# Patient Record
Sex: FEMALE | Race: WHITE | NOT HISPANIC OR LATINO | URBAN - METROPOLITAN AREA
[De-identification: names, ages, dates, MRNs, and addresses within clinical notes are randomized per-mention and may not be internally consistent; named-entity substitution may affect disease eponyms.]

---

## 2017-03-15 ENCOUNTER — APPOINTMENT (RX ONLY)
Dept: URBAN - METROPOLITAN AREA CLINIC 156 | Facility: CLINIC | Age: 32
Setting detail: DERMATOLOGY
End: 2017-03-15

## 2017-03-15 DIAGNOSIS — Z41.9 ENCOUNTER FOR PROCEDURE FOR PURPOSES OTHER THAN REMEDYING HEALTH STATE, UNSPECIFIED: ICD-10-CM

## 2017-03-15 PROCEDURE — ? CHEMICAL PEEL

## 2017-03-15 NOTE — PROCEDURE: CHEMICAL PEEL
Prep: Peel prep pad
Comments: Extractions with q tips on cheeks and chin\\nRestorative mask applied for 5 minutes
Detail Level: Zone
Post-Care Instructions: I reviewed with the patient in detail post-care instructions. Patient should avoid sun exposure and wear sun protection.
Consent: Prior to the procedure, written consent was obtained and risks were reviewed, including but not limited to: redness, peeling, blistering, pigmentary change, scarring, infection, and pain.
Treatment Number: 0
Treatment Time (Optional): Peel left for 8 minutes
Chemical Peel: Vivite Peel
Post Peel Care: Pro heal and hydra cool , UV clear tinted

## 2018-05-14 ENCOUNTER — APPOINTMENT (RX ONLY)
Dept: URBAN - METROPOLITAN AREA CLINIC 156 | Facility: CLINIC | Age: 33
Setting detail: DERMATOLOGY
End: 2018-05-14

## 2018-05-14 DIAGNOSIS — Z41.9 ENCOUNTER FOR PROCEDURE FOR PURPOSES OTHER THAN REMEDYING HEALTH STATE, UNSPECIFIED: ICD-10-CM

## 2018-05-14 PROCEDURE — ? LASER HAIR REMOVAL

## 2018-05-14 NOTE — PROCEDURE: LASER HAIR REMOVAL
Cooling Override: 23
Cooling: DCD setting
Fluence (Will Not Render If 0): 20
Pulse Duration: 10 ms
Pulse Duration: 0.45 ms
Number Of Prepaid Treatments (Will Not Render If 0): 0
Fluence (Will Not Render If 0): 10
Consent: Written consent obtained, risks reviewed including but not limited to crusting, scabbing, blistering, scarring, darker or lighter pigmentary change, paradoxical hair regrowth, incomplete removal of hair and infection.
Post-Care Instructions: I reviewed with the patient in detail post-care instructions. Patient should avoid sun for a minimum of 4 weeks before and after treatment.
Cooling: DCD 30/20
Laser Type: Nd:Yag 1064nm
Cooling: DCD 40/30
Render Post-Care In The Note: No
Spot Size: 18 mm
Total Pulses: 45
Fluence (Will Not Render If 0): 12
Total Pulses: 286
Pre-Procedure: Prior to proceeding the treatment areas were cleaned and all present put on their eye protection.
Spot Size: 1.5 mm
Pulse Duration: 3 ms
Detail Level: Generalized
Post-Procedure Care: Immediate endpoint: perifollicular erythema and edema. Laser Gel or Extreme Protect were applied post laser. Post care reviewed with patient.
Device Serial Number (Optional): [id number]
Fluence (Will Not Render If 0): 20

## 2019-01-08 ENCOUNTER — APPOINTMENT (RX ONLY)
Dept: URBAN - METROPOLITAN AREA CLINIC 156 | Facility: CLINIC | Age: 34
Setting detail: DERMATOLOGY
End: 2019-01-08

## 2019-01-08 VITALS
RESPIRATION RATE: 18 BRPM | SYSTOLIC BLOOD PRESSURE: 125 MMHG | WEIGHT: 145 LBS | DIASTOLIC BLOOD PRESSURE: 95 MMHG | HEART RATE: 76 BPM

## 2019-01-08 DIAGNOSIS — D22 MELANOCYTIC NEVI: ICD-10-CM

## 2019-01-08 PROBLEM — D48.5 NEOPLASM OF UNCERTAIN BEHAVIOR OF SKIN: Status: ACTIVE | Noted: 2019-01-08

## 2019-01-08 PROCEDURE — ? SHAVE REMOVAL

## 2019-01-08 PROCEDURE — 11310 SHAVE SKIN LESION 0.5 CM/<: CPT

## 2019-01-08 ASSESSMENT — LOCATION SIMPLE DESCRIPTION DERM: LOCATION SIMPLE: NOSE

## 2019-01-08 ASSESSMENT — LOCATION DETAILED DESCRIPTION DERM: LOCATION DETAILED: NASAL SUPRATIP

## 2019-01-08 ASSESSMENT — LOCATION ZONE DERM: LOCATION ZONE: NOSE

## 2019-01-08 NOTE — PROCEDURE: SHAVE REMOVAL
Hemostasis: Drysol
Notification Instructions: Patient will be notified of biopsy results. However, patient instructed to call the office if not contacted within 2 weeks.
Size Of Lesion In Cm (Required): 0.4
Medical Necessity Information: It is in your best interest to select a reason for this procedure from the list below. All of these items fulfill various CMS LCD requirements except the new and changing color options.
Detail Level: Detailed
Billing Type: Third-Party Bill
X Size Of Lesion In Cm (Optional): 0.3
Lab Facility: [id number]
Lab: [id number]
Consent was obtained from the patient. The risks and benefits to therapy were discussed in detail. Specifically, the risks of infection, scarring, bleeding, prolonged wound healing, incomplete removal, allergy to anesthesia, nerve injury and recurrence were addressed. Prior to the procedure, the treatment site was clearly identified and confirmed by the patient. All components of Universal Protocol/PAUSE Rule completed.
Bill [id number] For Specimen Handling/Conveyance To Laboratory?: no
Wound Care: Petrolatum
Medical Necessity Clause: This procedure was medically necessary because the lesion that was treated was: enlarging
Anesthesia Type: 1% lidocaine with epinephrine
Path Notes (To The Dermatopathologist): Please check margins.
Was A Bandage Applied: Yes
Post-Care Instructions: I reviewed with the patient in detail post-care instructions. Patient is to keep the biopsy site dry overnight, and then apply bacitracin twice daily until healed. Patient may apply hydrogen peroxide soaks to remove any crusting.
Biopsy Method: Dermablade

## 2019-07-09 ENCOUNTER — APPOINTMENT (RX ONLY)
Dept: URBAN - METROPOLITAN AREA CLINIC 156 | Facility: CLINIC | Age: 34
Setting detail: DERMATOLOGY
End: 2019-07-09

## 2019-07-09 DIAGNOSIS — Z41.9 ENCOUNTER FOR PROCEDURE FOR PURPOSES OTHER THAN REMEDYING HEALTH STATE, UNSPECIFIED: ICD-10-CM

## 2019-07-09 PROCEDURE — ? LASER HAIR REMOVAL

## 2019-07-09 ASSESSMENT — LOCATION DETAILED DESCRIPTION DERM
LOCATION DETAILED: LEFT CHIN
LOCATION DETAILED: LEFT INFERIOR CENTRAL MALAR CHEEK
LOCATION DETAILED: RIGHT SUPERIOR MEDIAL BUCCAL CHEEK

## 2019-07-09 ASSESSMENT — LOCATION SIMPLE DESCRIPTION DERM
LOCATION SIMPLE: LEFT CHEEK
LOCATION SIMPLE: RIGHT CHEEK
LOCATION SIMPLE: CHIN

## 2019-07-09 ASSESSMENT — LOCATION ZONE DERM: LOCATION ZONE: FACE

## 2019-07-09 NOTE — PROCEDURE: LASER HAIR REMOVAL
Pre-Procedure: Prior to proceeding the treatment areas were cleaned and all present put on their eye protection.
Cooling: DCD 40/20
Total Pulses: 53
Pulse Duration: 0.45 ms
Treatment Number: 0
Consent: Written consent obtained, risks reviewed including but not limited to crusting, scabbing, blistering, scarring, darker or lighter pigmentary change, paradoxical hair regrowth, incomplete removal of hair and infection.
Fluence (Will Not Render If 0): 12
Fluence (Will Not Render If 0): 20
Spot Size: 18 mm
Spot Size: 1.5 mm
Laser Type: Nd:Yag 1064nm
Pulse Duration: 3 ms
Cooling: DCD setting
Detail Level: Generalized
Device Serial Number (Optional): [id number]
Render Post-Care In The Note: No
Fluence (Will Not Render If 0): 14
Total Pulses: 35
Post-Care Instructions: I reviewed with the patient in detail post-care instructions. Patient should avoid sun for a minimum of 4 weeks before and after treatment.
Post-Procedure Care: Immediate endpoint: perifollicular erythema and edema. Laser Gel or Extreme Protect were applied post laser. Post care reviewed with patient.
Cooling: DCD 40/30
Fluence (Will Not Render If 0): 20

## 2019-12-13 ENCOUNTER — APPOINTMENT (RX ONLY)
Dept: URBAN - METROPOLITAN AREA CLINIC 156 | Facility: CLINIC | Age: 34
Setting detail: DERMATOLOGY
End: 2019-12-13

## 2019-12-13 VITALS — SYSTOLIC BLOOD PRESSURE: 122 MMHG | HEART RATE: 64 BPM | WEIGHT: 140 LBS | DIASTOLIC BLOOD PRESSURE: 87 MMHG

## 2019-12-13 DIAGNOSIS — B35.1 TINEA UNGUIUM: ICD-10-CM

## 2019-12-13 PROBLEM — B35.3 TINEA PEDIS: Status: ACTIVE | Noted: 2019-12-13

## 2019-12-13 PROBLEM — L65.0 TELOGEN EFFLUVIUM: Status: ACTIVE | Noted: 2019-12-13

## 2019-12-13 PROCEDURE — ? COUNSELING

## 2019-12-13 PROCEDURE — ? TREATMENT REGIMEN

## 2019-12-13 PROCEDURE — 99213 OFFICE O/P EST LOW 20 MIN: CPT

## 2019-12-13 PROCEDURE — ? PRESCRIPTION

## 2019-12-13 RX ORDER — TAVABOROLE 43.5 MG/ML
1 SOLUTION TOPICAL QD
Qty: 1 | Refills: 2 | Status: ERX | COMMUNITY
Start: 2019-12-13

## 2019-12-13 RX ORDER — ECONAZOLE NITRATE 10 MG/G
1 CREAM TOPICAL BID
Qty: 1 | Refills: 3 | Status: ERX | COMMUNITY
Start: 2019-12-13

## 2019-12-13 RX ADMIN — TAVABOROLE 1: 43.5 SOLUTION TOPICAL at 00:00

## 2019-12-13 RX ADMIN — ECONAZOLE NITRATE 1: 10 CREAM TOPICAL at 00:00

## 2019-12-13 ASSESSMENT — LOCATION SIMPLE DESCRIPTION DERM: LOCATION SIMPLE: LEFT GREAT TOE

## 2019-12-13 ASSESSMENT — LOCATION DETAILED DESCRIPTION DERM: LOCATION DETAILED: LEFT DORSAL GREAT TOE

## 2019-12-13 ASSESSMENT — LOCATION ZONE DERM: LOCATION ZONE: TOE

## 2019-12-13 NOTE — PROCEDURE: TREATMENT REGIMEN
Continue Regimen: Spironolactone
Plan: Patient instructed to try Male minoxidil 5% and increase Biotin intake. Patient informed on PRP  for future treatment.
Detail Level: Simple

## 2021-08-16 ENCOUNTER — RX ONLY (OUTPATIENT)
Age: 36
Setting detail: RX ONLY
End: 2021-08-16

## 2021-08-16 ENCOUNTER — APPOINTMENT (RX ONLY)
Dept: URBAN - METROPOLITAN AREA CLINIC 154 | Facility: CLINIC | Age: 36
Setting detail: DERMATOLOGY
End: 2021-08-16

## 2021-08-16 DIAGNOSIS — Z41.9 ENCOUNTER FOR PROCEDURE FOR PURPOSES OTHER THAN REMEDYING HEALTH STATE, UNSPECIFIED: ICD-10-CM

## 2021-08-16 PROCEDURE — ? OTHER

## 2021-08-16 RX ORDER — PROGESTERONE 100 MG/1
CAPSULE ORAL
Qty: 90 | Refills: 0 | Status: ERX | COMMUNITY
Start: 2021-08-16

## 2021-08-16 RX ORDER — LEVOTHYROXINE, LIOTHYRONINE 38; 9 UG/1; UG/1
TABLET ORAL
Qty: 90 | Refills: 0 | Status: ERX | COMMUNITY
Start: 2021-08-16

## 2021-08-16 RX ORDER — TESTOSTERONE
POWDER (GRAM) MISCELLANEOUS
Qty: 90 | Refills: 0 | Status: ERX | COMMUNITY
Start: 2021-08-16

## 2021-08-16 RX ORDER — PROGESTERONE 200 MG/1
CAPSULE ORAL
Qty: 90 | Refills: 0 | Status: ERX | COMMUNITY
Start: 2021-08-16

## 2021-08-16 NOTE — PROCEDURE: OTHER
Note Text (......Xxx Chief Complaint.): This diagnosis correlates with the
Detail Level: Zone
Render Risk Assessment In Note?: no
Other (Free Text): VS: Wt 156.6lbs., /84, O2Sat 98%, HR 80\\nLMP Ablation 2016\\nLast Pap: 2020\\nLast MMG: NA\\n\\n36 y/o female presents for HRT f/u.  She was my patient from a previous location so we will get those records to review.  She has been off of her hormones for about 2 months now and she would like to restart because she really feels a difference.  She was recently started on lisinopril from her PCP because of elevated blood pressures.  She had an ablation but really felt last week like she was cramping and having breast tenderness.  We angie labs last visit with her.  OK to restart current regimen today.  No acute distress noted.\\n\\nA/P:\\n1.  HRT f/u:  refill current RXs, f/u in 3m.

## 2022-03-09 ENCOUNTER — RX ONLY (OUTPATIENT)
Age: 37
Setting detail: RX ONLY
End: 2022-03-09

## 2022-03-09 ENCOUNTER — APPOINTMENT (RX ONLY)
Dept: URBAN - METROPOLITAN AREA CLINIC 154 | Facility: CLINIC | Age: 37
Setting detail: DERMATOLOGY
End: 2022-03-09

## 2022-03-09 VITALS
WEIGHT: 165 LBS | OXYGEN SATURATION: 98 % | HEIGHT: 60 IN | SYSTOLIC BLOOD PRESSURE: 120 MMHG | DIASTOLIC BLOOD PRESSURE: 90 MMHG | HEART RATE: 82 BPM

## 2022-03-09 DIAGNOSIS — Z41.9 ENCOUNTER FOR PROCEDURE FOR PURPOSES OTHER THAN REMEDYING HEALTH STATE, UNSPECIFIED: ICD-10-CM

## 2022-03-09 PROCEDURE — ? OTHER

## 2022-03-09 RX ORDER — LEVOTHYROXINE, LIOTHYRONINE 38; 9 UG/1; UG/1
TABLET ORAL
Qty: 60 | Refills: 0 | Status: ERX | COMMUNITY
Start: 2022-03-09

## 2022-03-09 RX ORDER — TESTOSTERONE
POWDER (GRAM) MISCELLANEOUS
Qty: 60 | Refills: 0 | Status: ERX | COMMUNITY
Start: 2022-03-09

## 2022-03-09 RX ORDER — PROGESTERONE 200 MG/1
CAPSULE ORAL
Qty: 60 | Refills: 0 | Status: ERX | COMMUNITY
Start: 2022-03-09

## 2022-03-09 RX ORDER — PROGESTERONE 100 MG/1
CAPSULE ORAL
Qty: 60 | Refills: 0 | Status: ERX | COMMUNITY
Start: 2022-03-09

## 2022-03-10 ENCOUNTER — RX ONLY (OUTPATIENT)
Age: 37
Setting detail: RX ONLY
End: 2022-03-10

## 2022-03-10 RX ORDER — PHENTERMINE HYDROCHLORIDE 37.5 MG/1
37.5 MG TABLET ORAL
Qty: 60 | Refills: 0 | Status: ERX | COMMUNITY
Start: 2022-03-10

## 2022-04-27 ENCOUNTER — APPOINTMENT (RX ONLY)
Dept: URBAN - METROPOLITAN AREA CLINIC 154 | Facility: CLINIC | Age: 37
Setting detail: DERMATOLOGY
End: 2022-04-27

## 2022-04-27 ENCOUNTER — RX ONLY (OUTPATIENT)
Age: 37
Setting detail: RX ONLY
End: 2022-04-27

## 2022-04-27 VITALS
DIASTOLIC BLOOD PRESSURE: 70 MMHG | SYSTOLIC BLOOD PRESSURE: 100 MMHG | HEART RATE: 79 BPM | OXYGEN SATURATION: 97 % | WEIGHT: 165 LBS | HEIGHT: 60 IN

## 2022-04-27 DIAGNOSIS — Z41.9 ENCOUNTER FOR PROCEDURE FOR PURPOSES OTHER THAN REMEDYING HEALTH STATE, UNSPECIFIED: ICD-10-CM

## 2022-04-27 PROCEDURE — ? OTHER

## 2022-04-27 RX ORDER — PHENDIMETRAZINE TARTRATE 35 MG/1
TABLET ORAL
Qty: 90 | Refills: 0 | Status: ERX | COMMUNITY
Start: 2022-04-27

## 2022-05-02 ENCOUNTER — RX ONLY (OUTPATIENT)
Age: 37
Setting detail: RX ONLY
End: 2022-05-02

## 2022-05-02 RX ORDER — LEVOTHYROXINE, LIOTHYRONINE 38; 9 UG/1; UG/1
TABLET ORAL
Qty: 90 | Refills: 0 | Status: ERX

## 2022-05-02 RX ORDER — PROGESTERONE 200 MG/1
CAPSULE ORAL
Qty: 180 | Refills: 0 | Status: ERX

## 2022-05-02 RX ORDER — TESTOSTERONE
POWDER (GRAM) MISCELLANEOUS
Qty: 90 | Refills: 0 | Status: ERX

## 2022-07-27 ENCOUNTER — RX ONLY (OUTPATIENT)
Age: 37
Setting detail: RX ONLY
End: 2022-07-27

## 2022-07-27 ENCOUNTER — APPOINTMENT (RX ONLY)
Dept: URBAN - METROPOLITAN AREA CLINIC 154 | Facility: CLINIC | Age: 37
Setting detail: DERMATOLOGY
End: 2022-07-27

## 2022-07-27 VITALS
HEIGHT: 60 IN | DIASTOLIC BLOOD PRESSURE: 80 MMHG | OXYGEN SATURATION: 97 % | HEART RATE: 83 BPM | WEIGHT: 162 LBS | SYSTOLIC BLOOD PRESSURE: 110 MMHG

## 2022-07-27 DIAGNOSIS — Z41.9 ENCOUNTER FOR PROCEDURE FOR PURPOSES OTHER THAN REMEDYING HEALTH STATE, UNSPECIFIED: ICD-10-CM

## 2022-07-27 PROCEDURE — ? OTHER

## 2022-07-27 RX ORDER — LEVOTHYROXINE, LIOTHYRONINE 38; 9 UG/1; UG/1
TABLET ORAL
Qty: 90 | Refills: 0 | Status: ERX

## 2022-07-27 RX ORDER — TESTOSTERONE
POWDER (GRAM) MISCELLANEOUS
Qty: 90 | Refills: 0 | Status: ERX

## 2022-07-27 RX ORDER — PROGESTERONE 200 MG/1
CAPSULE ORAL
Qty: 180 | Refills: 0 | Status: ERX

## 2022-08-01 ENCOUNTER — RX ONLY (OUTPATIENT)
Age: 37
Setting detail: RX ONLY
End: 2022-08-01

## 2022-08-01 RX ORDER — TERBINAFINE HYDROCHLORIDE 250 MG/1
TABLET ORAL
Qty: 45 | Refills: 0 | Status: ERX | COMMUNITY
Start: 2022-08-01

## 2022-08-15 ENCOUNTER — RX ONLY (OUTPATIENT)
Age: 37
Setting detail: RX ONLY
End: 2022-08-15

## 2022-08-15 RX ORDER — FLUCONAZOLE 150 MG/1
TABLET ORAL
Qty: 12 | Refills: 0 | Status: ERX | COMMUNITY
Start: 2022-08-15

## 2022-08-15 RX ORDER — CICLOPIROX 80 MG/ML
SOLUTION TOPICAL
Qty: 1 | Refills: 0 | Status: ERX | COMMUNITY
Start: 2022-08-15

## 2022-10-31 ENCOUNTER — RX ONLY (OUTPATIENT)
Age: 37
Setting detail: RX ONLY
End: 2022-10-31

## 2022-10-31 RX ORDER — LEVOTHYROXINE, LIOTHYRONINE 38; 9 UG/1; UG/1
TABLET ORAL
Qty: 30 | Refills: 0 | Status: ERX

## 2022-10-31 RX ORDER — PROGESTERONE 200 MG/1
CAPSULE ORAL
Qty: 60 | Refills: 0 | Status: ERX

## 2022-12-01 ENCOUNTER — RX ONLY (OUTPATIENT)
Age: 37
Setting detail: RX ONLY
End: 2022-12-01

## 2022-12-01 RX ORDER — PROGESTERONE 200 MG/1
CAPSULE ORAL
Qty: 60 | Refills: 0 | Status: ERX

## 2022-12-05 ENCOUNTER — APPOINTMENT (RX ONLY)
Dept: URBAN - METROPOLITAN AREA CLINIC 154 | Facility: CLINIC | Age: 37
Setting detail: DERMATOLOGY
End: 2022-12-05

## 2022-12-05 VITALS
OXYGEN SATURATION: 99 % | HEIGHT: 60 IN | WEIGHT: 159 LBS | DIASTOLIC BLOOD PRESSURE: 70 MMHG | SYSTOLIC BLOOD PRESSURE: 120 MMHG | HEART RATE: 78 BPM

## 2022-12-05 DIAGNOSIS — Z41.9 ENCOUNTER FOR PROCEDURE FOR PURPOSES OTHER THAN REMEDYING HEALTH STATE, UNSPECIFIED: ICD-10-CM

## 2022-12-05 PROCEDURE — ? OTHER

## 2022-12-06 ENCOUNTER — RX ONLY (OUTPATIENT)
Age: 37
Setting detail: RX ONLY
End: 2022-12-06

## 2022-12-06 RX ORDER — TESTOSTERONE
POWDER (GRAM) MISCELLANEOUS
Qty: 90 | Refills: 0 | Status: ERX

## 2022-12-06 RX ORDER — PROGESTERONE 200 MG/1
CAPSULE ORAL
Qty: 180 | Refills: 2 | Status: ERX

## 2022-12-06 RX ORDER — LEVOTHYROXINE, LIOTHYRONINE 38; 9 UG/1; UG/1
TABLET ORAL
Qty: 90 | Refills: 1 | Status: ERX

## 2023-06-05 ENCOUNTER — RX ONLY (OUTPATIENT)
Age: 38
Setting detail: RX ONLY
End: 2023-06-05

## 2023-06-05 RX ORDER — PROGESTERONE 200 MG/1
CAPSULE ORAL
Qty: 180 | Refills: 0 | Status: ERX

## 2023-06-05 RX ORDER — LEVOTHYROXINE, LIOTHYRONINE 38; 9 UG/1; UG/1
TABLET ORAL
Qty: 30 | Refills: 0 | Status: ERX

## 2023-06-05 RX ORDER — TESTOSTERONE
POWDER (GRAM) MISCELLANEOUS
Qty: 30 | Refills: 0 | Status: ERX

## 2023-06-12 ENCOUNTER — APPOINTMENT (RX ONLY)
Dept: URBAN - METROPOLITAN AREA CLINIC 154 | Facility: CLINIC | Age: 38
Setting detail: DERMATOLOGY
End: 2023-06-12

## 2023-06-12 VITALS
SYSTOLIC BLOOD PRESSURE: 120 MMHG | HEART RATE: 88 BPM | DIASTOLIC BLOOD PRESSURE: 80 MMHG | OXYGEN SATURATION: 98 % | WEIGHT: 152 LBS | HEIGHT: 60 IN

## 2024-01-23 ENCOUNTER — RX ONLY (OUTPATIENT)
Age: 39
Setting detail: RX ONLY
End: 2024-01-23

## 2024-01-23 ENCOUNTER — APPOINTMENT (RX ONLY)
Dept: URBAN - METROPOLITAN AREA CLINIC 154 | Facility: CLINIC | Age: 39
Setting detail: DERMATOLOGY
End: 2024-01-23

## 2024-01-23 VITALS
SYSTOLIC BLOOD PRESSURE: 104 MMHG | DIASTOLIC BLOOD PRESSURE: 70 MMHG | HEIGHT: 60 IN | OXYGEN SATURATION: 99 % | HEART RATE: 82 BPM | WEIGHT: 127 LBS

## 2024-01-23 DIAGNOSIS — Z41.9 ENCOUNTER FOR PROCEDURE FOR PURPOSES OTHER THAN REMEDYING HEALTH STATE, UNSPECIFIED: ICD-10-CM

## 2024-01-23 PROCEDURE — ? OTHER

## 2024-01-23 RX ORDER — PROGESTERONE 200 MG/1
CAPSULE ORAL
Qty: 180 | Refills: 0 | Status: ERX

## 2024-01-23 RX ORDER — TESTOSTERONE
POWDER (GRAM) MISCELLANEOUS
Qty: 90 | Refills: 0 | Status: ERX

## 2024-01-26 ENCOUNTER — APPOINTMENT (RX ONLY)
Dept: URBAN - METROPOLITAN AREA CLINIC 154 | Facility: CLINIC | Age: 39
Setting detail: DERMATOLOGY
End: 2024-01-26

## 2024-01-26 DIAGNOSIS — Z41.9 ENCOUNTER FOR PROCEDURE FOR PURPOSES OTHER THAN REMEDYING HEALTH STATE, UNSPECIFIED: ICD-10-CM

## 2024-01-26 PROCEDURE — ? HYDRAFACIAL

## 2024-01-26 PROCEDURE — ? DERMAPLANE

## 2024-01-26 PROCEDURE — ? VBEAM PERFECTA LASER

## 2024-01-26 PROCEDURE — ? CAUTERY

## 2024-01-26 ASSESSMENT — LOCATION DETAILED DESCRIPTION DERM
LOCATION DETAILED: RIGHT INFERIOR MEDIAL FOREHEAD
LOCATION DETAILED: RIGHT MID TEMPLE
LOCATION DETAILED: RIGHT SUPERIOR TEMPLE
LOCATION DETAILED: LEFT MID TEMPLE
LOCATION DETAILED: LEFT INFERIOR TEMPLE
LOCATION DETAILED: RIGHT INFERIOR MEDIAL MALAR CHEEK

## 2024-01-26 ASSESSMENT — LOCATION SIMPLE DESCRIPTION DERM
LOCATION SIMPLE: RIGHT CHEEK
LOCATION SIMPLE: RIGHT TEMPLE
LOCATION SIMPLE: LEFT TEMPLE
LOCATION SIMPLE: RIGHT FOREHEAD

## 2024-01-26 ASSESSMENT — LOCATION ZONE DERM: LOCATION ZONE: FACE

## 2024-01-26 NOTE — PROCEDURE: CAUTERY
Detail Level: Zone
Anesthesia Volume In Cc: 0
Anesthesia Type: 1% lidocaine with epinephrine
Power (Include Units If Applicable): 1.1
Estimated Blood Loss (Optional): none

## 2024-01-26 NOTE — PROCEDURE: DERMAPLANE
Post-Procedure Instructions: Following the dermaplane procedure, Moisturizer and SPF was applied.
Treatment Areas: face
Treatment Area Prep: acetone
Blade: 10 blade scalpel
Post-Care Instructions: I reviewed with the patient in detail post-care instructions.
Detail Level: Zone
Pre-Procedure Text: The patient was placed in a recumbant position on the procedure table.

## 2024-01-26 NOTE — PROCEDURE: HYDRAFACIAL
Procedure: Exfoliation
Vacuum Pressure Low Setting (Will Not Render If Set To 0): 22
Solution Override
Tip Override
Location: face
Number Of Passes Step 3: 1
Tip Override: Lip Perk Hydropeel Tip
Vacuum Pressure High Setting (Will Not Render If Set To 0): 0
Number Of Passes Step 1: 2
Solution: Activ-4
Solution: GlySal 7.5%
Tip: Hydropeel Tip, Clear
Tip: Hydropeel Tip, Teal
Procedure: Fusion
Procedure: Peel
Vacuum Pressure Low Setting (Will Not Render If Set To 0): 13
Solution Override: Lip Perk Solution
Consent: Written consent obtained, risks reviewed including but not limited to crusting, scabbing, blistering, scarring, darker or lighter pigmentary change, bruising, and/or incomplete response.
Indication: anti-aging
Post-Care Instructions: I reviewed with the patient in detail post-care instructions. Patient should stay away from the sun and wear sun protection until treated areas are fully healed.
Tip: Hydropeel Tip, Blue
Solution: GlySal 15%
Procedure: Extraction
Vacuum Pressure Low Setting (Will Not Render If Set To 0): 12

## 2024-01-26 NOTE — PROCEDURE: VBEAM PERFECTA LASER
Delay Time (Ms): 0
Is There A Second Setting?: no
Pulse Duration: 1.5 ms
Pulse Duration: 40 ms
Consent: Written consent obtained.  Risks were reviewed including but not limited to crusting, scabbing, blistering, scarring, darker or lighter pigmentary change, bruising, and/or incomplete response.
Treatment Number: 1
Spot Size: 7 mm
Post-Care Instructions: I reviewed with the patient in detail post-care instructions.  Cool compresses or cold gel packs may be applied after treatment.  Exposure to the sun should be avoided and sun protection and sunscreen should be used.
Detail Level: Zone
Pre-Procedure: The treatment areas identified by the patient were cleansed and treatment was performed with the parameters mentioned above.
Spot Size: 10 mm
Pulse Duration: 10 ms
Location: lower lip
Spray Time (Ms): 30
Post-Procedure: Following the procedure the post care instructions were reviewed with the patient.
Delay Time (Ms): 20

## 2024-03-04 ENCOUNTER — APPOINTMENT (RX ONLY)
Dept: URBAN - METROPOLITAN AREA CLINIC 154 | Facility: CLINIC | Age: 39
Setting detail: DERMATOLOGY
End: 2024-03-04

## 2024-03-04 DIAGNOSIS — Z41.9 ENCOUNTER FOR PROCEDURE FOR PURPOSES OTHER THAN REMEDYING HEALTH STATE, UNSPECIFIED: ICD-10-CM

## 2024-03-04 PROCEDURE — ? SCITON MOXI

## 2024-03-04 PROCEDURE — ? SCITON BBL HERO

## 2024-03-04 ASSESSMENT — LOCATION SIMPLE DESCRIPTION DERM: LOCATION SIMPLE: RIGHT CHEEK

## 2024-03-04 ASSESSMENT — LOCATION ZONE DERM: LOCATION ZONE: FACE

## 2024-03-04 ASSESSMENT — LOCATION DETAILED DESCRIPTION DERM: LOCATION DETAILED: RIGHT INFERIOR CENTRAL MALAR CHEEK

## 2024-03-04 NOTE — PROCEDURE: SCITON MOXI
Treatment Number: 1
Total Coverage (%): 10
External Cooling Fan Speed: 5
Laser Type: Fractionated 1927nm, thulium laser
Detail Level: Zone
Post-Care Instructions: I reviewed with the patient in detail post-care instructions.  Recommended diligent sun protection and sun avoidance.
Pre=procedure Text: After consent was obtained, the treatment areas were cleaned and treated using the parameters noted above.
Level 3
Topical Anesthesia?: 23% lidocaine, 7% tetracaine
Energy (J/Cm2): 7.5
Consent: Written consent obtained.  Risks were reviewed including but not limited to crusting, scabbing, blistering, scarring, darker or lighter pigmentary change, incomplete improvement, prolonged erythema and facial swelling, infection, and bleeding.
Length Of Topical Anesthesia Application (Optional): 30 minutes
Anesthesia Type: 1% lidocaine with epinephrine
Number Of Passes: 6

## 2024-03-04 NOTE — PROCEDURE: SCITON BBL HERO
Procedure Note: After applying gel and applying protective eyeware, treatment was administered using the setting parameters listed above.
Pulse Width - Will Not Render If 0: 3
Add Setting 2?: no
Temp (C): 25
Pulse Width Units: milliseconds
Consent: Written consent obtained.  Risks reviewed including but not limited to crusting, scabbing, blistering, scarring, darker or lighter pigmentary change, and incomplete clearance.
Detail Level: Zone
Filter: 560nm Filter
Topical Anesthesia Type: 23% lidocaine, 7% tetracaine
Temp (C): 24
Fluence (J/Cm2) - Will Not Render If 0: 0
Anesthesia Type: 1% lidocaine with epinephrine
Filter: 515nm Filter
Spot Size: Finesse Adapter Size: 11 mm round
Temp (C): 15
Post-Care Instructions: I reviewed with the patient in detail post-care instructions. Patient should avoid sun exposure before and after treatment.  Patient should wear sunscreen.
Treatment Number: 1
Spot Size: Finesse Adapter Size: 11 mm square
Filter: 640nm Filter
Spot Size: Finesse Adapter Size: 15 x 45 mm (No Finesse Adapter)
Rate (Hz): 4
Fluence (J/Cm2) - Will Not Render If 0: 5
Spot Size: Finesse Adapter Size: 15 x 15 mm square
Total Pulses (Optional): 500

## 2024-04-17 ENCOUNTER — RX ONLY (OUTPATIENT)
Age: 39
Setting detail: RX ONLY
End: 2024-04-17

## 2024-04-17 RX ORDER — PROGESTERONE 200 MG/1
CAPSULE ORAL
Qty: 180 | Refills: 0 | Status: ERX

## 2024-04-17 RX ORDER — TESTOSTERONE
POWDER (GRAM) MISCELLANEOUS
Qty: 15 | Refills: 0 | Status: ERX

## 2024-04-29 ENCOUNTER — APPOINTMENT (RX ONLY)
Dept: URBAN - METROPOLITAN AREA CLINIC 154 | Facility: CLINIC | Age: 39
Setting detail: DERMATOLOGY
End: 2024-04-29

## 2024-04-29 VITALS — WEIGHT: 137 LBS | SYSTOLIC BLOOD PRESSURE: 100 MMHG | HEIGHT: 60 IN | DIASTOLIC BLOOD PRESSURE: 70 MMHG

## 2024-04-29 DIAGNOSIS — Z41.9 ENCOUNTER FOR PROCEDURE FOR PURPOSES OTHER THAN REMEDYING HEALTH STATE, UNSPECIFIED: ICD-10-CM

## 2024-04-29 PROCEDURE — ? OTHER

## 2024-05-01 ENCOUNTER — RX ONLY (OUTPATIENT)
Age: 39
Setting detail: RX ONLY
End: 2024-05-01

## 2024-05-01 RX ORDER — TESTOSTERONE
POWDER (GRAM) MISCELLANEOUS
Qty: 90 | Refills: 1 | Status: ERX

## 2024-07-03 ENCOUNTER — APPOINTMENT (RX ONLY)
Dept: URBAN - METROPOLITAN AREA CLINIC 154 | Facility: CLINIC | Age: 39
Setting detail: DERMATOLOGY
End: 2024-07-03

## 2024-07-03 DIAGNOSIS — Z41.9 ENCOUNTER FOR PROCEDURE FOR PURPOSES OTHER THAN REMEDYING HEALTH STATE, UNSPECIFIED: ICD-10-CM

## 2024-07-03 PROCEDURE — ? SCITON CLEARV LASER

## 2024-07-03 PROCEDURE — ? CAUTERY

## 2024-07-03 PROCEDURE — ? DERMAPLANE

## 2024-07-03 PROCEDURE — ? HYDRAFACIAL

## 2024-07-03 PROCEDURE — ? SCITON BARE HR

## 2024-07-03 ASSESSMENT — LOCATION SIMPLE DESCRIPTION DERM
LOCATION SIMPLE: RIGHT CHEEK
LOCATION SIMPLE: LEFT CHEEK
LOCATION SIMPLE: LEFT LIP

## 2024-07-03 ASSESSMENT — LOCATION DETAILED DESCRIPTION DERM
LOCATION DETAILED: LEFT CENTRAL MALAR CHEEK
LOCATION DETAILED: RIGHT INFERIOR CENTRAL MALAR CHEEK
LOCATION DETAILED: LEFT INFERIOR VERMILION LIP

## 2024-07-03 ASSESSMENT — LOCATION ZONE DERM
LOCATION ZONE: LIP
LOCATION ZONE: FACE

## 2024-07-03 NOTE — PROCEDURE: HYDRAFACIAL
Procedure: Extraction
Number Of Passes Step 6: 0
Vacuum Pressure Low Setting (Will Not Render If Set To 0): 13
Solution Override
Tip: Hydropeel Tip, Teal
Solution Override: Lip Perk Solution
Number Of Passes Step 4: 1
Procedure: Peel
Location: face
Solution: GlySal 7.5%
Procedure: Exfoliation
Vacuum Pressure Low Setting (Will Not Render If Set To 0): 12
Tip Override
Tip: Hydropeel Tip, Blue
Solution: GlySal 15%
Tip Override: Lip Perk Hydropeel Tip
Procedure: Fusion
Consent: Written consent obtained, risks reviewed including but not limited to crusting, scabbing, blistering, scarring, darker or lighter pigmentary change, bruising, and/or incomplete response.
Tip: Hydropeel Tip, Clear
Solution: Activ-4
Vacuum Pressure Low Setting (Will Not Render If Set To 0): 22
Number Of Passes Step 1: 2
Indication: anti-aging
Post-Care Instructions: I reviewed with the patient in detail post-care instructions. Patient should stay away from the sun and wear sun protection until treated areas are fully healed.

## 2024-07-03 NOTE — PROCEDURE: DERMAPLANE
Treatment Area Prep: acetone
Treatment Areas: face
Pre-Procedure Text: The patient was placed in a recumbant position on the procedure table.
Post-Procedure Instructions: Following the dermaplane procedure, Moisturizer and SPF was applied.
Post-Care Instructions: I reviewed with the patient in detail post-care instructions.
Detail Level: Zone
Blade: 10 blade scalpel

## 2024-07-03 NOTE — PROCEDURE: SCITON CLEARV LASER
Detail Level: Simple
Add Another Setting?: no
Laser Frequency (Include Units): 1.0
Location: lower lip
Vessel Size/Spot Size (Optional Include Units): 0.3
Skin Type: III
Fluence (Optional Include Units): 80
Consent: Written consent obtained, risks reviewed including but not limited to crusting, scabbing, blistering, scarring, darker or lighter pigmentary change, systemic reactions, ulceration, incidental hair removal, bruising, and/or incomplete removal.
Post-Care Instructions: I reviewed with the patient in detail post-care instructions. Patient should avoid sunlight and wear sun protection.
Pulse Width (Optional- Include Units): 30
Treatment Number (Will Not Render If 0): 1
External Cooling Fan Speed: 0
Indication Override: Ven[place]
Pulse Count (Optional): 3

## 2024-07-03 NOTE — PROCEDURE: CAUTERY
Anesthesia Type: 1% lidocaine with epinephrine
Detail Level: Zone
Anesthesia Volume In Cc: 0
Power (Include Units If Applicable): 1.1
Estimated Blood Loss (Optional): minimal

## 2024-07-03 NOTE — PROCEDURE: SCITON BARE HR
Anesthesia Type: 1% lidocaine with epinephrine
External Cooling Fan Speed: 0
Spot Size: 30mm x 9mm
Total Pulses (Optional): 243
Hair Density: high
Handpiece: Diode
Add Another Setting?: no
Consent: Written consent obtained.  Risks reviewed including but not limited to crusting, scabbing, blistering, scarring, darker or lighter pigmentary change, paradoxical hair regrowth, incomplete removal of hair and infection.
Handpiece: Blend
Procedure Note: Treatment was administered using the setting parameters listed above.
Hair Diameter: course
Treatment Mode: Speed
Treatment Number: 1
Fluence (J/Cm2): 13
Skin Type: III
Number Of Prepaid Treatments (Will Not Render If 0): 6
Post-Care Instructions: I reviewed with the patient in detail post-care instructions. Patient should avoid sun exposure before and after treatment.
Detail Level: Zone
Pulse Width (Include Units): 9
Hair Color: black
Rate (Hz): 3

## 2024-08-14 ENCOUNTER — APPOINTMENT (RX ONLY)
Dept: URBAN - METROPOLITAN AREA CLINIC 154 | Facility: CLINIC | Age: 39
Setting detail: DERMATOLOGY
End: 2024-08-14

## 2024-08-14 DIAGNOSIS — Z41.9 ENCOUNTER FOR PROCEDURE FOR PURPOSES OTHER THAN REMEDYING HEALTH STATE, UNSPECIFIED: ICD-10-CM

## 2024-08-14 PROCEDURE — ? DERMAPLANE

## 2024-08-14 PROCEDURE — ? SCITON CLEARV LASER

## 2024-08-14 PROCEDURE — ? SCITON BARE HR

## 2024-08-14 PROCEDURE — ? HYDRAFACIAL

## 2024-08-14 ASSESSMENT — LOCATION SIMPLE DESCRIPTION DERM
LOCATION SIMPLE: RIGHT CHEEK
LOCATION SIMPLE: LEFT LIP

## 2024-08-14 ASSESSMENT — LOCATION DETAILED DESCRIPTION DERM
LOCATION DETAILED: LEFT INFERIOR VERMILION LIP
LOCATION DETAILED: RIGHT INFERIOR CENTRAL MALAR CHEEK

## 2024-08-14 ASSESSMENT — LOCATION ZONE DERM
LOCATION ZONE: FACE
LOCATION ZONE: LIP

## 2024-08-14 NOTE — PROCEDURE: SCITON CLEARV LASER
Add Another Setting?: no
Fluence (Optional Include Units): 80
Pulse Width (Optional- Include Units): 30
Consent: Written consent obtained, risks reviewed including but not limited to crusting, scabbing, blistering, scarring, darker or lighter pigmentary change, systemic reactions, ulceration, incidental hair removal, bruising, and/or incomplete removal.
Detail Level: Simple
Post-Care Instructions: I reviewed with the patient in detail post-care instructions. Patient should avoid sunlight and wear sun protection.
Vessel Size/Spot Size (Optional Include Units): 0.4
Laser Frequency (Include Units): 1.0
Treatment Number (Will Not Render If 0): 2
External Cooling Fan Speed: 0
Indication Override: venous lake
Location: upper and lower lip
Pulse Count (Optional): 3 pulses each spot
Skin Type: III

## 2024-08-14 NOTE — PROCEDURE: HYDRAFACIAL
Tip: Hydropeel Tip, Teal
Vacuum Pressure Low Setting (Will Not Render If Set To 0): 13
Solution Override
Location: face
Procedure: Extraction
Vacuum Pressure Low Setting (Will Not Render If Set To 0): 22
Number Of Passes Step 4: 1
Solution Override: Lip Perk Solution
Number Of Passes Step 1: 2
Vacuum Pressure Low Setting (Will Not Render If Set To 0): 12
Vacuum Pressure High Setting (Will Not Render If Set To 0): 0
Solution: Activ-4
Consent: Written consent obtained, risks reviewed including but not limited to crusting, scabbing, blistering, scarring, darker or lighter pigmentary change, bruising, and/or incomplete response.
Tip Override
Solution: GlySal 7.5%
Post-Care Instructions: I reviewed with the patient in detail post-care instructions. Patient should stay away from the sun and wear sun protection until treated areas are fully healed.
Tip: Hydropeel Tip, Clear
Procedure: Peel
Tip Override: Lip Perk Hydropeel Tip
Procedure: Fusion
Indication: anti-aging
Tip: Hydropeel Tip, Blue
Solution: GlySal 15%
Procedure: Exfoliation

## 2024-08-14 NOTE — PROCEDURE: DERMAPLANE
Pre-Procedure Text: The patient was placed in a recumbant position on the procedure table.
Post-Procedure Instructions: Following the dermaplane procedure, Moisturizer and SPF was applied.
Detail Level: Zone
Treatment Area Prep: acetone
Treatment Areas: face
Post-Care Instructions: I reviewed with the patient in detail post-care instructions.
Blade: 10 blade scalpel

## 2024-08-14 NOTE — PROCEDURE: SCITON BARE HR
Add Another Setting?: no
Hair Density: high
Detail Level: Zone
Handpiece: Blend
Add Another Setting?: yes
Hair Diameter: course
External Cooling Fan Speed: 0
Fluence (J/Cm2): 15
Consent: Written consent obtained.  Risks reviewed including but not limited to crusting, scabbing, blistering, scarring, darker or lighter pigmentary change, paradoxical hair regrowth, incomplete removal of hair and infection.
Treatment Mode: Speed
Fluence (J/Cm2): 9
Procedure Note: Treatment was administered using the setting parameters listed above.
Pulse Width (Include Units): 11
Anesthesia Type: 1% lidocaine with epinephrine
Pulse Width (Include Units): 30
Post-Care Instructions: I reviewed with the patient in detail post-care instructions. Patient should avoid sun exposure before and after treatment.
Rate (Hz): 3
Spot Size: 30mm x 9mm
Treatment Number: 2
Hair Color: black
Total Pulses (Optional): 108
Number Of Prepaid Treatments (Will Not Render If 0): 6
Total Pulses (Optional): 199

## 2024-08-16 ENCOUNTER — RX ONLY (OUTPATIENT)
Age: 39
Setting detail: RX ONLY
End: 2024-08-16

## 2024-08-16 RX ORDER — PROGESTERONE 200 MG/1
CAPSULE ORAL
Qty: 180 | Refills: 0 | Status: ERX

## 2024-09-24 ENCOUNTER — APPOINTMENT (RX ONLY)
Dept: URBAN - METROPOLITAN AREA CLINIC 154 | Facility: CLINIC | Age: 39
Setting detail: DERMATOLOGY
End: 2024-09-24

## 2024-09-24 DIAGNOSIS — Z41.9 ENCOUNTER FOR PROCEDURE FOR PURPOSES OTHER THAN REMEDYING HEALTH STATE, UNSPECIFIED: ICD-10-CM

## 2024-09-24 PROCEDURE — ? SCITON BARE HR

## 2024-09-24 PROCEDURE — ? HYDRAFACIAL

## 2024-09-24 PROCEDURE — ? SCITON CLEARV LASER

## 2024-09-24 PROCEDURE — ? DERMAPLANE

## 2024-09-24 ASSESSMENT — LOCATION ZONE DERM
LOCATION ZONE: LIP
LOCATION ZONE: FACE

## 2024-09-24 ASSESSMENT — LOCATION SIMPLE DESCRIPTION DERM
LOCATION SIMPLE: LEFT LIP
LOCATION SIMPLE: RIGHT CHEEK

## 2024-09-24 NOTE — PROCEDURE: HYDRAFACIAL
Tip Override: Lip Perk Hydropeel Tip
Solution: GlySal 7.5%
Tip: Hydropeel Tip, Teal
Number Of Passes Step 5: 0
Location: face
Vacuum Pressure Low Setting (Will Not Render If Set To 0): 22
Solution: Activ-4
Procedure: Peel
Number Of Passes Step 3: 1
Solution Override
Post-Care Instructions: I reviewed with the patient in detail post-care instructions. Patient should stay away from the sun and wear sun protection until treated areas are fully healed.
Vacuum Pressure Low Setting (Will Not Render If Set To 0): 13
Consent: Written consent obtained, risks reviewed including but not limited to crusting, scabbing, blistering, scarring, darker or lighter pigmentary change, bruising, and/or incomplete response.
Tip: Hydropeel Tip, Clear
Solution Override: Lip Perk Solution
Solution: GlySal 15%
Procedure: Fusion
Procedure: Extraction
Tip Override
Tip: Hydropeel Tip, Blue
Procedure: Exfoliation
Vacuum Pressure Low Setting (Will Not Render If Set To 0): 12
Number Of Passes Step 1: 2
Indication: anti-aging

## 2024-09-24 NOTE — PROCEDURE: SCITON BARE HR
Add Another Setting?: no
Total Pulses (Optional): 357
Hair Color: dark brown
Spot Size: 30mm x 9mm
External Cooling Fan Speed: 0
Rate (Hz): 1
Consent: Written consent obtained.  Risks reviewed including but not limited to crusting, scabbing, blistering, scarring, darker or lighter pigmentary change, paradoxical hair regrowth, incomplete removal of hair and infection.
Pulse Width (Include Units): 11
Procedure Note: Treatment was administered using the setting parameters listed above.
Fluence (J/Cm2): 15
Anesthesia Type: 1% lidocaine with epinephrine
Treatment Mode: Speed
Post-Care Instructions: I reviewed with the patient in detail post-care instructions. Patient should avoid sun exposure before and after treatment.
Detail Level: Zone
Hair Diameter: medium
Handpiece: Diode
Handpiece: Blend
Hair Density: high

## 2024-09-24 NOTE — PROCEDURE: DERMAPLANE
Treatment Area Prep: acetone
Treatment Areas: face
Post-Care Instructions: I reviewed with the patient in detail post-care instructions.
Pre-Procedure Text: The patient was placed in a recumbant position on the procedure table.
Post-Procedure Instructions: Following the dermaplane procedure, Moisturizer and SPF was applied.
Detail Level: Zone
Blade: 10 blade scalpel

## 2024-09-24 NOTE — PROCEDURE: SCITON CLEARV LASER
Pulse Width (Optional- Include Units): 30
Vessel Size/Spot Size (Optional Include Units): 0.4
Consent: Written consent obtained, risks reviewed including but not limited to crusting, scabbing, blistering, scarring, darker or lighter pigmentary change, systemic reactions, ulceration, incidental hair removal, bruising, and/or incomplete removal.
Detail Level: Simple
Add Another Setting?: no
Laser Frequency (Include Units): 1.0
Post-Care Instructions: I reviewed with the patient in detail post-care instructions. Patient should avoid sunlight and wear sun protection.
Vessel Size/Spot Size (Optional Include Units): 0.4-0.6
Fluence (Optional Include Units): 80
Location: lower lip
Number Of Prepaid Treatments (Will Not Render If 0): 0
Indication Override: [place]
Pulse Count (Optional): 3
Fluence (Optional Include Units): 110

## 2024-10-29 ENCOUNTER — APPOINTMENT (RX ONLY)
Dept: URBAN - METROPOLITAN AREA CLINIC 154 | Facility: CLINIC | Age: 39
Setting detail: DERMATOLOGY
End: 2024-10-29

## 2024-10-29 VITALS — WEIGHT: 142 LBS | HEIGHT: 60 IN

## 2024-10-29 DIAGNOSIS — Z41.9 ENCOUNTER FOR PROCEDURE FOR PURPOSES OTHER THAN REMEDYING HEALTH STATE, UNSPECIFIED: ICD-10-CM

## 2024-10-29 PROCEDURE — ? OTHER

## 2024-11-02 ENCOUNTER — RX ONLY (OUTPATIENT)
Age: 39
Setting detail: RX ONLY
End: 2024-11-02

## 2024-11-02 RX ORDER — TESTOSTERONE
POWDER (GRAM) MISCELLANEOUS
Qty: 90 | Refills: 0 | Status: ERX

## 2024-11-07 ENCOUNTER — RX ONLY (OUTPATIENT)
Age: 39
Setting detail: RX ONLY
End: 2024-11-07

## 2024-11-07 RX ORDER — PROGESTERONE 100 %
POWDER (GRAM) MISCELLANEOUS
Qty: 90 | Refills: 0 | Status: ERX | COMMUNITY
Start: 2024-11-07

## 2024-12-09 ENCOUNTER — APPOINTMENT (RX ONLY)
Dept: URBAN - METROPOLITAN AREA CLINIC 154 | Facility: CLINIC | Age: 39
Setting detail: DERMATOLOGY
End: 2024-12-09

## 2024-12-09 DIAGNOSIS — Z41.9 ENCOUNTER FOR PROCEDURE FOR PURPOSES OTHER THAN REMEDYING HEALTH STATE, UNSPECIFIED: ICD-10-CM

## 2024-12-09 PROCEDURE — ? DERMAPLANE

## 2024-12-09 PROCEDURE — ? HYDRAFACIAL

## 2024-12-09 PROCEDURE — ? SCITON BARE HR

## 2024-12-09 ASSESSMENT — LOCATION ZONE DERM: LOCATION ZONE: FACE

## 2024-12-09 ASSESSMENT — LOCATION DETAILED DESCRIPTION DERM: LOCATION DETAILED: RIGHT INFERIOR MEDIAL MALAR CHEEK

## 2024-12-09 ASSESSMENT — LOCATION SIMPLE DESCRIPTION DERM: LOCATION SIMPLE: RIGHT CHEEK

## 2024-12-09 NOTE — PROCEDURE: HYDRAFACIAL
Solution Override
Post-Care Instructions: I reviewed with the patient in detail post-care instructions. Patient should stay away from the sun and wear sun protection until treated areas are fully healed.
Number Of Passes Step 6: 0
Procedure: Extraction
Number Of Passes Step 4: 1
Tip: Hydropeel Tip, Teal
Vacuum Pressure Low Setting (Will Not Render If Set To 0): 12
Solution: Activ-4
Vacuum Pressure Low Setting (Will Not Render If Set To 0): 22
Number Of Passes Step 1: 2
Vacuum Pressure Low Setting (Will Not Render If Set To 0): 13
Procedure: Peel
Solution: GlySal 7.5%
Tip Override: Lip Perk Hydropeel Tip
Tip Override
Tip: Hydropeel Tip, Clear
Indication: anti-aging
Procedure: Fusion
Solution: GlySal 15%
Tip: Hydropeel Tip, Blue
Solution Override: Lip Perk Solution
Location: face
Procedure: Exfoliation
Consent: Written consent obtained, risks reviewed including but not limited to crusting, scabbing, blistering, scarring, darker or lighter pigmentary change, bruising, and/or incomplete response.

## 2024-12-09 NOTE — PROCEDURE: SCITON BARE HR
Spot Size: 30mm x 9mm
External Cooling Fan Speed: 0
Treatment Number: 4
Rate (Hz): 3
Total Pulses (Optional): 132
Hair Color: black
Add Another Setting?: no
Consent: Written consent obtained.  Risks reviewed including but not limited to crusting, scabbing, blistering, scarring, darker or lighter pigmentary change, paradoxical hair regrowth, incomplete removal of hair and infection.
Number Of Prepaid Treatments (Will Not Render If 0): 6
Hair Density: medium
Handpiece: Blend
Procedure Note: Treatment was administered using the setting parameters listed above.
Skin Type: III
Anesthesia Type: 1% lidocaine with epinephrine
Handpiece: Diode
Detail Level: Zone
Post-Care Instructions: I reviewed with the patient in detail post-care instructions. Patient should avoid sun exposure before and after treatment.
Treatment Mode: Speed
Fluence (J/Cm2): 15
Pulse Width (Include Units): 11

## 2024-12-09 NOTE — PROCEDURE: DERMAPLANE
Blade: 10 blade scalpel
Treatment Area Prep: acetone
Pre-Procedure Text: The patient was placed in a recumbant position on the procedure table.
Treatment Areas: face and neck
Detail Level: Zone
Post-Procedure Instructions: Following the dermaplane procedure, Moisturizer and SPF was applied.
Post-Care Instructions: I reviewed with the patient in detail post-care instructions.

## 2025-01-16 ENCOUNTER — APPOINTMENT (OUTPATIENT)
Dept: URBAN - METROPOLITAN AREA CLINIC 154 | Facility: CLINIC | Age: 40
Setting detail: DERMATOLOGY
End: 2025-01-16

## 2025-01-16 DIAGNOSIS — Z41.9 ENCOUNTER FOR PROCEDURE FOR PURPOSES OTHER THAN REMEDYING HEALTH STATE, UNSPECIFIED: ICD-10-CM

## 2025-01-16 PROCEDURE — ? DERMAPLANE

## 2025-01-16 PROCEDURE — ? HYDRAFACIAL

## 2025-01-16 PROCEDURE — ? LASER HAIR REMOVAL

## 2025-01-16 ASSESSMENT — LOCATION SIMPLE DESCRIPTION DERM: LOCATION SIMPLE: RIGHT CHEEK

## 2025-01-16 ASSESSMENT — LOCATION ZONE DERM: LOCATION ZONE: FACE

## 2025-01-16 ASSESSMENT — LOCATION DETAILED DESCRIPTION DERM: LOCATION DETAILED: RIGHT INFERIOR CENTRAL MALAR CHEEK

## 2025-01-16 NOTE — PROCEDURE: HYDRAFACIAL
Solution Override
Number Of Passes Step 2: 1
Tip: Hydropeel Tip, Teal
Number Of Passes Step 5: 0
Vacuum Pressure Low Setting (Will Not Render If Set To 0): 13
Vacuum Pressure Low Setting (Will Not Render If Set To 0): 12
Consent: Written consent obtained, risks reviewed including but not limited to crusting, scabbing, blistering, scarring, darker or lighter pigmentary change, bruising, and/or incomplete response.
Solution: GlySal 15%
Solution Override: Lip Perk Solution
Procedure: Extraction
Tip: Hydropeel Tip, Blue
Tip Override
Vacuum Pressure Low Setting (Will Not Render If Set To 0): 22
Post-Care Instructions: I reviewed with the patient in detail post-care instructions. Patient should stay away from the sun and wear sun protection until treated areas are fully healed.
Indication: anti-aging
Procedure: Exfoliation
Tip: Hydropeel Tip, Clear
Number Of Passes Step 1: 2
Procedure: Fusion
Solution: GlySal 7.5%
Location: face
Tip Override: Lip Perk Hydropeel Tip
Solution: Activ-4
Procedure: Peel
Lymphatic Therapy: yes

## 2025-01-16 NOTE — PROCEDURE: DERMAPLANE
Post-Care Instructions: I reviewed with the patient in detail post-care instructions.
Treatment Areas: face
Post-Procedure Instructions: Following the dermaplane procedure, Moisturizer and SPF was applied.
Treatment Area Prep: acetone
Blade: 10 blade scalpel
Detail Level: Zone
Pre-Procedure Text: The patient was placed in a recumbant position on the procedure table.

## 2025-01-16 NOTE — PROCEDURE: LASER HAIR REMOVAL
Fluence (Will Not Render If 0): 12
Post-Care Instructions: I reviewed with the patient in detail post-care instructions. Patient should avoid sun for a minimum of 4 weeks before and after treatment.
Treatment Number: 0
Cooling: DCD setting
Detail Level: Zone
Laser Type: Alexandrite 755nm
Post-Procedure Care: Immediate endpoint: perifollicular erythema and edema. Vaseline and ice applied. Post care reviewed with patient.
Tolerated Procedure (Optional): 10 out of 10
Fluence (Will Not Render If 0): 20
Were Eye Shields Employed?: No
Cooling: DCD 40/20
Pulse Duration (Include Units): 3
Fluence (Will Not Render If 0): 10
Spot Size: 18 mm
Shaving (Optional): The patient was shaved in the office prior to the procedure
Eye Shield Text: Given the treatment area eye shields were inserted prior to treatment.
Treatment Number: 5
Consent: Written consent obtained, risks reviewed including but not limited to crusting, scabbing, blistering, scarring, darker or lighter pigmentary change, paradoxical hair regrowth, incomplete removal of hair and infection.
Pre-Procedure: Prior to proceeding the treatment areas were cleaned and all present put on their eye protection.
Number Of Prepaid Treatments (Will Not Render If 0): 6
Topical Anesthesia Type: 23% lidocaine, 7% tetracaine
Cooling: DCD 40/30

## 2025-02-21 ENCOUNTER — RX ONLY (RX ONLY)
Age: 40
End: 2025-02-21

## 2025-02-21 RX ORDER — PROGESTERONE 100 %
POWDER (GRAM) MISCELLANEOUS
Qty: 45 | Refills: 0 | Status: ERX

## 2025-02-21 RX ORDER — TESTOSTERONE
POWDER (GRAM) MISCELLANEOUS
Qty: 45 | Refills: 0 | Status: CANCELLED

## 2025-02-27 ENCOUNTER — APPOINTMENT (OUTPATIENT)
Dept: URBAN - METROPOLITAN AREA CLINIC 154 | Facility: CLINIC | Age: 40
Setting detail: DERMATOLOGY
End: 2025-02-27

## 2025-02-27 DIAGNOSIS — Z41.9 ENCOUNTER FOR PROCEDURE FOR PURPOSES OTHER THAN REMEDYING HEALTH STATE, UNSPECIFIED: ICD-10-CM

## 2025-02-27 PROCEDURE — ? LASER HAIR REMOVAL

## 2025-02-27 PROCEDURE — ? DERMAPLANE

## 2025-02-27 PROCEDURE — ? CAUTERY

## 2025-02-27 PROCEDURE — ? HYDRAFACIAL

## 2025-02-27 ASSESSMENT — LOCATION SIMPLE DESCRIPTION DERM
LOCATION SIMPLE: LEFT CHEEK
LOCATION SIMPLE: RIGHT CHEEK

## 2025-02-27 ASSESSMENT — LOCATION DETAILED DESCRIPTION DERM
LOCATION DETAILED: RIGHT SUPERIOR MEDIAL BUCCAL CHEEK
LOCATION DETAILED: LEFT INFERIOR CENTRAL MALAR CHEEK

## 2025-02-27 ASSESSMENT — LOCATION ZONE DERM: LOCATION ZONE: FACE

## 2025-02-27 NOTE — PROCEDURE: DERMAPLANE
Pre-Procedure Text: The patient was placed in a recumbant position on the procedure table.
Post-Care Instructions: I reviewed with the patient in detail post-care instructions.
Detail Level: Zone
Post-Procedure Instructions: Following the dermaplane procedure, Moisturizer and SPF was applied.
Treatment Area Prep: acetone
Blade: 10 blade scalpel
Treatment Areas: face

## 2025-02-27 NOTE — PROCEDURE: CAUTERY
Detail Level: Zone
Anesthesia Type: 1% lidocaine with epinephrine
Anesthesia Volume In Cc: 0
Power (Include Units If Applicable): 1.1
Estimated Blood Loss (Optional): none

## 2025-02-27 NOTE — PROCEDURE: HYDRAFACIAL
Tip: Hydropeel Tip, Blue
Solution: GlySal 15%
Tip: Hydropeel Tip, Teal
Procedure: Extraction
Number Of Passes Step 6: 0
Vacuum Pressure Low Setting (Will Not Render If Set To 0): 22
Vacuum Pressure Low Setting (Will Not Render If Set To 0): 12
Procedure: Exfoliation
Location: back
Number Of Passes Step 3: 1
Number Of Passes Step 1: 2
Tip Override: Lip Perk Hydropeel Tip
Tip: Hydropeel Tip, Clear
Solution Override
Vacuum Pressure Low Setting (Will Not Render If Set To 0): 13
Procedure: Fusion
Solution: Activ-4
Solution: GlySal 7.5%
Consent: Written consent obtained, risks reviewed including but not limited to crusting, scabbing, blistering, scarring, darker or lighter pigmentary change, bruising, and/or incomplete response.
Solution Override: Lip Perk Solution
Procedure: Peel
Post-Care Instructions: I reviewed with the patient in detail post-care instructions. Patient should stay away from the sun and wear sun protection until treated areas are fully healed.
Indication: anti-aging
Tip Override

## 2025-02-27 NOTE — PROCEDURE: LASER HAIR REMOVAL
Treatment Number: 6
Treatment Number: 0
Pulse Duration (Include Units): 3
Consent: Written consent obtained, risks reviewed including but not limited to crusting, scabbing, blistering, scarring, darker or lighter pigmentary change, paradoxical hair regrowth, incomplete removal of hair and infection.
Cooling: DCD setting
Eye Shield Text: Given the treatment area eye shields were inserted prior to treatment.
Shaving (Optional): The patient was shaved in the office prior to the procedure
Fluence (Will Not Render If 0): 20
Fluence (Will Not Render If 0): 12
Spot Size: 18 mm
Cooling: DCD 40/20
Post-Procedure Care: Immediate endpoint: perifollicular erythema and edema. Vaseline and ice applied. Post care reviewed with patient.
Post-Care Instructions: I reviewed with the patient in detail post-care instructions. Patient should avoid sun for a minimum of 4 weeks before and after treatment.
Detail Level: Zone
Laser Type: Alexandrite 755nm
Cooling: DCD 40/30
Render Post-Care In The Note: No
Topical Anesthesia Type: 23% lidocaine, 7% tetracaine
Tolerated Procedure (Optional): 10 out of 10
Pre-Procedure: Prior to proceeding the treatment areas were cleaned and all present put on their eye protection.

## 2025-03-25 ENCOUNTER — APPOINTMENT (OUTPATIENT)
Dept: URBAN - METROPOLITAN AREA CLINIC 154 | Facility: CLINIC | Age: 40
Setting detail: DERMATOLOGY
End: 2025-03-25

## 2025-03-25 VITALS
SYSTOLIC BLOOD PRESSURE: 120 MMHG | DIASTOLIC BLOOD PRESSURE: 84 MMHG | HEART RATE: 90 BPM | OXYGEN SATURATION: 97 % | HEIGHT: 60 IN | WEIGHT: 156 LBS

## 2025-03-25 DIAGNOSIS — Z41.9 ENCOUNTER FOR PROCEDURE FOR PURPOSES OTHER THAN REMEDYING HEALTH STATE, UNSPECIFIED: ICD-10-CM

## 2025-03-25 PROCEDURE — ? OTHER

## 2025-03-25 NOTE — PROCEDURE: OTHER
Render Risk Assessment In Note?: no
Note Text (......Xxx Chief Complaint.): This diagnosis correlates with the
Detail Level: Zone
Other (Free Text): 39 yr.old female here for hormone follow up. She feels something is off. She is having labs here except she took her T/DHEA/VITD @9am and it is 2pm. So she will take tomorrow and be here at 10am. I will send labs to [facility] and she will comment in chart. She is only getting 6 hrs of sleep nightly. Will schedule her for 6 months but [facility] may change that depends on results.   Daria Ventura MA

## 2025-03-28 ENCOUNTER — RX ONLY (RX ONLY)
Age: 40
End: 2025-03-28

## 2025-03-28 RX ORDER — TESTOSTERONE
POWDER (GRAM) MISCELLANEOUS
Qty: 90 | Refills: 0 | Status: ERX

## 2025-03-28 RX ORDER — PROGESTERONE 100 %
POWDER (GRAM) MISCELLANEOUS
Qty: 90 | Refills: 1 | Status: ERX

## 2025-04-08 ENCOUNTER — APPOINTMENT (OUTPATIENT)
Dept: URBAN - METROPOLITAN AREA CLINIC 154 | Facility: CLINIC | Age: 40
Setting detail: DERMATOLOGY
End: 2025-04-08

## 2025-04-08 DIAGNOSIS — Z41.9 ENCOUNTER FOR PROCEDURE FOR PURPOSES OTHER THAN REMEDYING HEALTH STATE, UNSPECIFIED: ICD-10-CM

## 2025-04-08 PROCEDURE — ? DERMAPLANE

## 2025-04-08 PROCEDURE — ? HYDRAFACIAL

## 2025-04-08 ASSESSMENT — LOCATION SIMPLE DESCRIPTION DERM: LOCATION SIMPLE: RIGHT CHEEK

## 2025-04-08 ASSESSMENT — LOCATION DETAILED DESCRIPTION DERM: LOCATION DETAILED: RIGHT INFERIOR CENTRAL MALAR CHEEK

## 2025-04-08 ASSESSMENT — LOCATION ZONE DERM: LOCATION ZONE: FACE

## 2025-04-08 NOTE — PROCEDURE: DERMAPLANE
Post-Care Instructions: I reviewed with the patient in detail post-care instructions.
Treatment Areas: face
Detail Level: Zone
Pre-Procedure Text: The patient was placed in a recumbant position on the procedure table.
Post-Procedure Instructions: Following the dermaplane procedure, Moisturizer and SPF was applied.
Treatment Area Prep: acetone
Blade: 10 blade scalpel

## 2025-04-08 NOTE — PROCEDURE: HYDRAFACIAL
Location: face
Procedure: Exfoliation
Procedure: Fusion
Vacuum Pressure High Setting (Will Not Render If Set To 0): 0
Tip: Hydropeel Tip, Blue
Tip Override: Lip Perk Hydropeel Tip
Vacuum Pressure Low Setting (Will Not Render If Set To 0): 22
Tip Override
Procedure: Extraction
Solution Override
Number Of Passes Step 1: 2
Tip: Hydropeel Tip, Teal
Number Of Passes Step 4: 1
Solution: Activ-4
Vacuum Pressure Low Setting (Will Not Render If Set To 0): 13
Procedure: Peel
Solution Override: Lip Perk Solution
Consent: Written consent obtained, risks reviewed including but not limited to crusting, scabbing, blistering, scarring, darker or lighter pigmentary change, bruising, and/or incomplete response.
Indication: anti-aging
Solution: GlySal 7.5%
Vacuum Pressure Low Setting (Will Not Render If Set To 0): 12
Post-Care Instructions: I reviewed with the patient in detail post-care instructions. Patient should stay away from the sun and wear sun protection until treated areas are fully healed.
Tip: Hydropeel Tip, Clear
Solution: GlySal 15%

## 2025-06-10 ENCOUNTER — RX ONLY (RX ONLY)
Age: 40
End: 2025-06-10

## 2025-06-10 RX ORDER — TESTOSTERONE
POWDER (GRAM) MISCELLANEOUS
Qty: 90 | Refills: 0 | Status: ERX